# Patient Record
Sex: MALE | Race: WHITE | HISPANIC OR LATINO | Employment: UNEMPLOYED | URBAN - METROPOLITAN AREA
[De-identification: names, ages, dates, MRNs, and addresses within clinical notes are randomized per-mention and may not be internally consistent; named-entity substitution may affect disease eponyms.]

---

## 2020-07-15 ENCOUNTER — HOSPITAL ENCOUNTER (EMERGENCY)
Facility: MEDICAL CENTER | Age: 50
End: 2020-07-15
Attending: EMERGENCY MEDICINE
Payer: OTHER GOVERNMENT

## 2020-07-15 ENCOUNTER — APPOINTMENT (OUTPATIENT)
Dept: RADIOLOGY | Facility: MEDICAL CENTER | Age: 50
End: 2020-07-15
Attending: EMERGENCY MEDICINE
Payer: OTHER GOVERNMENT

## 2020-07-15 VITALS
RESPIRATION RATE: 18 BRPM | WEIGHT: 175.71 LBS | BODY MASS INDEX: 27.58 KG/M2 | TEMPERATURE: 98.7 F | HEIGHT: 67 IN | SYSTOLIC BLOOD PRESSURE: 141 MMHG | DIASTOLIC BLOOD PRESSURE: 90 MMHG | HEART RATE: 88 BPM | OXYGEN SATURATION: 97 %

## 2020-07-15 DIAGNOSIS — R51.9 ACUTE NONINTRACTABLE HEADACHE, UNSPECIFIED HEADACHE TYPE: ICD-10-CM

## 2020-07-15 DIAGNOSIS — Z20.822 EXPOSURE TO COVID-19 VIRUS: ICD-10-CM

## 2020-07-15 DIAGNOSIS — Z20.822 SUSPECTED COVID-19 VIRUS INFECTION: ICD-10-CM

## 2020-07-15 DIAGNOSIS — M79.10 MYALGIA: ICD-10-CM

## 2020-07-15 LAB
ALBUMIN SERPL BCP-MCNC: 4.6 G/DL (ref 3.2–4.9)
ALBUMIN/GLOB SERPL: 1.4 G/DL
ALP SERPL-CCNC: 113 U/L (ref 30–99)
ALT SERPL-CCNC: 55 U/L (ref 2–50)
ANION GAP SERPL CALC-SCNC: 13 MMOL/L (ref 7–16)
AST SERPL-CCNC: 41 U/L (ref 12–45)
BASOPHILS # BLD AUTO: 0.3 % (ref 0–1.8)
BASOPHILS # BLD: 0.02 K/UL (ref 0–0.12)
BILIRUB SERPL-MCNC: 0.2 MG/DL (ref 0.1–1.5)
BUN SERPL-MCNC: 15 MG/DL (ref 8–22)
CALCIUM SERPL-MCNC: 9.5 MG/DL (ref 8.5–10.5)
CHLORIDE SERPL-SCNC: 103 MMOL/L (ref 96–112)
CO2 SERPL-SCNC: 24 MMOL/L (ref 20–33)
COVID ORDER STATUS COVID19: NORMAL
CREAT SERPL-MCNC: 0.79 MG/DL (ref 0.5–1.4)
EOSINOPHIL # BLD AUTO: 0.02 K/UL (ref 0–0.51)
EOSINOPHIL NFR BLD: 0.3 % (ref 0–6.9)
ERYTHROCYTE [DISTWIDTH] IN BLOOD BY AUTOMATED COUNT: 43.7 FL (ref 35.9–50)
GLOBULIN SER CALC-MCNC: 3.3 G/DL (ref 1.9–3.5)
GLUCOSE SERPL-MCNC: 96 MG/DL (ref 65–99)
HCT VFR BLD AUTO: 51.9 % (ref 42–52)
HGB BLD-MCNC: 16.8 G/DL (ref 14–18)
IMM GRANULOCYTES # BLD AUTO: 0.02 K/UL (ref 0–0.11)
IMM GRANULOCYTES NFR BLD AUTO: 0.3 % (ref 0–0.9)
LYMPHOCYTES # BLD AUTO: 1.62 K/UL (ref 1–4.8)
LYMPHOCYTES NFR BLD: 25.5 % (ref 22–41)
MCH RBC QN AUTO: 30.3 PG (ref 27–33)
MCHC RBC AUTO-ENTMCNC: 32.4 G/DL (ref 33.7–35.3)
MCV RBC AUTO: 93.7 FL (ref 81.4–97.8)
MONOCYTES # BLD AUTO: 0.97 K/UL (ref 0–0.85)
MONOCYTES NFR BLD AUTO: 15.3 % (ref 0–13.4)
NEUTROPHILS # BLD AUTO: 3.7 K/UL (ref 1.82–7.42)
NEUTROPHILS NFR BLD: 58.3 % (ref 44–72)
NRBC # BLD AUTO: 0 K/UL
NRBC BLD-RTO: 0 /100 WBC
PLATELET # BLD AUTO: 232 K/UL (ref 164–446)
PMV BLD AUTO: 9.7 FL (ref 9–12.9)
POTASSIUM SERPL-SCNC: 4.1 MMOL/L (ref 3.6–5.5)
PROT SERPL-MCNC: 7.9 G/DL (ref 6–8.2)
RBC # BLD AUTO: 5.54 M/UL (ref 4.7–6.1)
SODIUM SERPL-SCNC: 140 MMOL/L (ref 135–145)
WBC # BLD AUTO: 6.4 K/UL (ref 4.8–10.8)

## 2020-07-15 PROCEDURE — C9803 HOPD COVID-19 SPEC COLLECT: HCPCS | Performed by: EMERGENCY MEDICINE

## 2020-07-15 PROCEDURE — 99284 EMERGENCY DEPT VISIT MOD MDM: CPT

## 2020-07-15 PROCEDURE — U0003 INFECTIOUS AGENT DETECTION BY NUCLEIC ACID (DNA OR RNA); SEVERE ACUTE RESPIRATORY SYNDROME CORONAVIRUS 2 (SARS-COV-2) (CORONAVIRUS DISEASE [COVID-19]), AMPLIFIED PROBE TECHNIQUE, MAKING USE OF HIGH THROUGHPUT TECHNOLOGIES AS DESCRIBED BY CMS-2020-01-R: HCPCS

## 2020-07-15 PROCEDURE — 96374 THER/PROPH/DIAG INJ IV PUSH: CPT

## 2020-07-15 PROCEDURE — 80053 COMPREHEN METABOLIC PANEL: CPT

## 2020-07-15 PROCEDURE — 700111 HCHG RX REV CODE 636 W/ 250 OVERRIDE (IP): Performed by: EMERGENCY MEDICINE

## 2020-07-15 PROCEDURE — 71045 X-RAY EXAM CHEST 1 VIEW: CPT

## 2020-07-15 PROCEDURE — 85025 COMPLETE CBC W/AUTO DIFF WBC: CPT

## 2020-07-15 RX ORDER — KETOROLAC TROMETHAMINE 30 MG/ML
15 INJECTION, SOLUTION INTRAMUSCULAR; INTRAVENOUS ONCE
Status: COMPLETED | OUTPATIENT
Start: 2020-07-15 | End: 2020-07-15

## 2020-07-15 RX ORDER — ALBUTEROL SULFATE 90 UG/1
2 AEROSOL, METERED RESPIRATORY (INHALATION) EVERY 4 HOURS PRN
Qty: 8.5 G | Refills: 0 | Status: SHIPPED | OUTPATIENT
Start: 2020-07-15

## 2020-07-15 RX ADMIN — KETOROLAC TROMETHAMINE 15 MG: 30 INJECTION, SOLUTION INTRAMUSCULAR at 20:51

## 2020-07-15 SDOH — HEALTH STABILITY: MENTAL HEALTH: HOW OFTEN DO YOU HAVE A DRINK CONTAINING ALCOHOL?: 2-4 TIMES A MONTH

## 2020-07-15 NOTE — Clinical Note
Timbo Tenorio was seen and treated in our emergency department on 7/15/2020.  He may return to work on 07/22/2020.  Please excuse from work he likely has covid19     If you have any questions or concerns, please don't hesitate to call.      Percy Rincon M.D.

## 2020-07-16 LAB
SARS-COV-2 RNA RESP QL NAA+PROBE: DETECTED
SPECIMEN SOURCE: ABNORMAL

## 2020-07-16 NOTE — ED NOTES
D/C instructions provided to patient at bedside, verbalizes understanding and states plans for follow-up with PCP as recommended. Covid d/c instructions provided   Scripts given  IV removed   All belongings accounted for, all questions answered at this time. Pt ambulated to lobby, steady gait

## 2020-07-16 NOTE — DISCHARGE INSTRUCTIONS
Es probable que tenga chantal enfermedad viral y puede que tenga COVID-19.    Por lo tanto, COVID-19 no se descarta y necesita permanecer en cuarentena casera hasta que todos los sakina siguientes son verdaderos:  1. Estas 10 sanchez desde el inicio de sintomas  2. Glenis sintomas estan mejorando  3. Ha estado brenda de fiebre nilda al menos 72 horas  Las pruebas solo se realizan a traves del distrito de antonia en teri momento, puedes llamarles a [phone]. Despues de un proceso de seleccion telefonico, puede o no que le yahaira la prueba.   Si desarrolla dificultad respiratoria significativa, lo que significa que es dificil caminar incluso distancias cortas sin tener que detenerse y recuperar el aliento, o te mareas mucho y esto es persistente entonces por favor regrese al departamento de emergencias.

## 2020-07-16 NOTE — ED PROVIDER NOTES
"ED Provider Note    Scribed for Percy Rincon M.D. by Sonya Carballo. 7/15/2020, 8:09 PM.    Primary care provider: No primary care provider noted.  Means of arrival: Walk-in  History obtained from: Patient  History limited by: None    CHIEF COMPLAINT  Chief Complaint   Patient presents with   • Pain     generalized body aches    • Headache       HPI  Timbo Tenorio is a 50 y.o. male who presents to the Emergency Department for evaluation of an acute headache onset three days prior to arrival. He endorses associated generalized body aches and a nonproductive cough, but denies any associated neck stiffness, loss of smell or taste, sore throat, nausea, vomiting, or diarrhea. He was recently exposed to a coworker who has COVID. No alleviating or exacerbating factors were identified.     History obtained via .     REVIEW OF SYSTEMS  Pertinent positives include headache, body aches, and nonproductive cough. Pertinent negatives include no sore throat, vomiting, or diarrhea. All other systems negative.    PAST MEDICAL HISTORY       SURGICAL HISTORY  patient denies any surgical history    SOCIAL HISTORY  Social History     Tobacco Use   • Smoking status: Current Every Day Smoker     Types: Cigarettes   • Smokeless tobacco: Never Used   Substance Use Topics   • Alcohol use: Yes     Frequency: 2-4 times a month   • Drug use: Never      Social History     Substance and Sexual Activity   Drug Use Never       FAMILY HISTORY  History reviewed. No pertinent family history.    CURRENT MEDICATIONS  Home Medications     Reviewed by Neisha Hastings R.N. (Registered Nurse) on 07/15/20 at [facility]  Med List Status: Complete   Medication Last Dose Status        Patient Jared Taking any Medications                       ALLERGIES  No Known Allergies    PHYSICAL EXAM  VITAL SIGNS: /90   Pulse 88   Temp 36.7 °C (98.1 °F) (Temporal)   Resp 16   Ht 1.702 m (5' 7\")   Wt 79.7 kg (175 lb 11.3 oz)   SpO2 97%   " BMI 27.52 kg/m²     Constitutional: Well developed, Well nourished, no distress.   HENT: Normocephalic, Atraumatic, mask in place  Eyes: Conjunctiva normal, No discharge.   Neck: Supple, No stridor, no meningismus  Cardiovascular: Normal heart rate, Normal rhythm, No murmurs, equal pulses.   Pulmonary: Normal breath sounds, No respiratory distress, No wheezing, No rales, No rhonchi.  Abdomen:Soft, No tenderness.  Back: No CVA tenderness.   Musculoskeletal: No major deformities noted, No tenderness.   Skin: Warm, Dry, No erythema, No rash.   Neurologic: Alert & oriented x 3, Normal motor function,  No focal deficits noted. Equal strength in upper and lower extremities.  Psychiatric: Affect normal, Judgment normal, Mood normal.       LABS  Results for orders placed or performed during the hospital encounter of 07/15/20   COVID/SARS CoV-2 PCR    Specimen: Nasopharyngeal; Respirate   Result Value Ref Range    COVID Order Status Received    CBC WITH DIFFERENTIAL   Result Value Ref Range    WBC 6.4 4.8 - 10.8 K/uL    RBC 5.54 4.70 - 6.10 M/uL    Hemoglobin 16.8 14.0 - 18.0 g/dL    Hematocrit 51.9 42.0 - 52.0 %    MCV 93.7 81.4 - 97.8 fL    MCH 30.3 27.0 - 33.0 pg    MCHC 32.4 (L) 33.7 - 35.3 g/dL    RDW 43.7 35.9 - 50.0 fL    Platelet Count 232 164 - 446 K/uL    MPV 9.7 9.0 - 12.9 fL    Neutrophils-Polys 58.30 44.00 - 72.00 %    Lymphocytes 25.50 22.00 - 41.00 %    Monocytes 15.30 (H) 0.00 - 13.40 %    Eosinophils 0.30 0.00 - 6.90 %    Basophils 0.30 0.00 - 1.80 %    Immature Granulocytes 0.30 0.00 - 0.90 %    Nucleated RBC 0.00 /100 WBC    Neutrophils (Absolute) 3.70 1.82 - 7.42 K/uL    Lymphs (Absolute) 1.62 1.00 - 4.80 K/uL    Monos (Absolute) 0.97 (H) 0.00 - 0.85 K/uL    Eos (Absolute) 0.02 0.00 - 0.51 K/uL    Baso (Absolute) 0.02 0.00 - 0.12 K/uL    Immature Granulocytes (abs) 0.02 0.00 - 0.11 K/uL    NRBC (Absolute) 0.00 K/uL   COMP METABOLIC PANEL   Result Value Ref Range    Sodium 140 135 - 145 mmol/L    Potassium  4.1 3.6 - 5.5 mmol/L    Chloride 103 96 - 112 mmol/L    Co2 24 20 - 33 mmol/L    Anion Gap 13.0 7.0 - 16.0    Glucose 96 65 - 99 mg/dL    Bun 15 8 - 22 mg/dL    Creatinine 0.79 0.50 - 1.40 mg/dL    Calcium 9.5 8.5 - 10.5 mg/dL    AST(SGOT) 41 12 - 45 U/L    ALT(SGPT) 55 (H) 2 - 50 U/L    Alkaline Phosphatase 113 (H) 30 - 99 U/L    Total Bilirubin 0.2 0.1 - 1.5 mg/dL    Albumin 4.6 3.2 - 4.9 g/dL    Total Protein 7.9 6.0 - 8.2 g/dL    Globulin 3.3 1.9 - 3.5 g/dL    A-G Ratio 1.4 g/dL   SARS-CoV-2, PCR (In-House)   Result Value Ref Range    SARS-CoV-2 Source NP Swab    ESTIMATED GFR   Result Value Ref Range    GFR If African American >60 >60 mL/min/1.73 m 2    GFR If Non African American >60 >60 mL/min/1.73 m 2     All labs reviewed by me.    RADIOLOGY  DX-CHEST-PORTABLE (1 VIEW)   Final Result         1.  No acute cardiopulmonary disease.        The radiologist's interpretation of all radiological studies have been reviewed by me.    COURSE & MEDICAL DECISION MAKING  Pertinent Labs & Imaging studies reviewed. (See chart for details)    8:09 PM - Patient seen and examined at bedside. Patient will be treated with Toradol. Ordered DX-chest, CBC with differential, CMP, and COVID/Sars to evaluate his symptoms. Emphasized the need for self isolation for at least ten days from onset of symptoms. Tylenol or ibuprofen should be used for his body aches. The differential diagnoses include but are not limited to: COVID, viral upper respiratory tract infection, pneumonia     10:04 PM - Patient was reevaluated at bedside. Discussed lab and radiology results with the patient and informed them that they are reassuring. Return precautions were discussed with the patient, and they were cleared for discharge at this time. Patient was understanding and agreeable to discharge.     Medical Decision Making: This point time I think the patient most likely is Covid19 based off his symptoms and the fact he has been exposed.  Discussed with  the patient that he will need to go home and self isolate.  He will be given albuterol inhaler to help with his coughing.  Patient is not hypoxic does not show any signs of pneumonia on his chest x-ray I therefore do not think he needs antibiotics.    The patient will return for new or worsening symptoms and is stable at the time of discharge.    The patient is referred to a primary physician for blood pressure management, diabetic screening, and for all other preventative health concerns.    DISPOSITION:  Patient will be discharged home in stable condition.    FOLLOW UP:  No follow-up provider specified.    OUTPATIENT MEDICATIONS:  Discharge Medication List as of 7/15/2020 10:08 PM      START taking these medications    Details   albuterol 108 (90 Base) MCG/ACT Aero Soln inhalation aerosol Inhale 2 Puffs by mouth every four hours as needed., Disp-8.5 g,R-0, Print Rx Paper              FINAL IMPRESSION  1. Suspected COVID-19 virus infection    2. Exposure to COVID-19 virus    3. Myalgia    4. Acute nonintractable headache, unspecified headache type          I, Sonya Chang), am scribing for, and in the presence of, Percy Rincon M.D.    Electronically signed by: Sonya Carballo (Javed), 7/15/2020    IPercy M.D. personally performed the services described in this documentation, as scribed by Sonya Carballo in my presence, and it is both accurate and complete. C    The note accurately reflects work and decisions made by me.  Percy Rincon M.D.  7/16/2020  1:09 AM

## 2020-07-16 NOTE — ED TRIAGE NOTES
Chief Complaint   Patient presents with   • Pain     generalized body aches    • Headache   pt has reported recent exposure to co-worker with +Covid, he states s/sx for last week. + non productive cough, pt is PWD NAD in triage. Interpretor Sukhi #[id number] used for triage. Pt wearing mask. Pt bought to [facility]

## 2020-07-17 NOTE — ED NOTES
COVID-19 Test Follow-Up  07/17/20    SARS-CoV-2 Source  NP Swab     SARS-CoV-2 by PCR  DETECT[facility]Panic        Patient is positive for COVID-19.    Using [place] interpretor [id number], I have informed the patient of the positive result by phone and that the Health Dept would be in contact soon. Instructed them to continue to quarantine and self-isolate according with the CDC guidance or as otherwise directed by the Health Dept.    Per the CDC, he should continue to quarantine until: At least 3 days (72 hours) have passed since recovery defined as resolution of fever without the use of fever-reducing medications and improvement in respiratory symptoms (e.g., cough, shortness of breath); and, At least 10 days have passed since symptoms first appeared.  He states he is still not feeling well. He is advised to return to the ER for worsening symptoms including difficulty breathing, ongoing fever, weakness or chest pain.    Leonie Duque, PharmD